# Patient Record
Sex: FEMALE | HISPANIC OR LATINO | Employment: FULL TIME | URBAN - METROPOLITAN AREA
[De-identification: names, ages, dates, MRNs, and addresses within clinical notes are randomized per-mention and may not be internally consistent; named-entity substitution may affect disease eponyms.]

---

## 2023-10-29 PROBLEM — Z86.2 HISTORY OF HYPOCHROMIC MICROCYTIC ANEMIA: Status: ACTIVE | Noted: 2022-05-18

## 2023-10-29 PROBLEM — J10.1 INFLUENZA A: Status: ACTIVE | Noted: 2020-02-05

## 2023-10-29 PROBLEM — E66.9 OBESITY (BMI 30-39.9): Status: ACTIVE | Noted: 2023-10-29

## 2023-10-29 PROBLEM — E78.5 DYSLIPIDEMIA: Status: ACTIVE | Noted: 2023-10-29

## 2023-10-29 PROBLEM — Z90.722 STATUS POST TAH-BSO: Status: ACTIVE | Noted: 2022-10-24

## 2023-10-29 PROBLEM — N85.2 ENLARGED UTERUS: Status: ACTIVE | Noted: 2023-10-29

## 2023-10-29 PROBLEM — N60.09 CYST OF BREAST: Status: ACTIVE | Noted: 2022-11-30

## 2023-10-29 PROBLEM — R50.9 FEVER: Status: ACTIVE | Noted: 2020-02-05

## 2023-10-29 PROBLEM — H10.13 ACUTE ATOPIC CONJUNCTIVITIS, BILATERAL: Status: ACTIVE | Noted: 2022-05-16

## 2023-10-29 PROBLEM — E66.01 MORBID OBESITY (MULTI): Status: ACTIVE | Noted: 2019-10-23

## 2023-10-29 PROBLEM — R68.83 CHILLS: Status: ACTIVE | Noted: 2020-02-05

## 2023-10-29 PROBLEM — J01.90 ACUTE SINUSITIS: Status: ACTIVE | Noted: 2022-05-25

## 2023-10-29 PROBLEM — D25.1 INTRAMURAL LEIOMYOMA OF UTERUS: Status: ACTIVE | Noted: 2023-10-29

## 2023-10-29 PROBLEM — R63.5 WEIGHT GAIN: Status: ACTIVE | Noted: 2023-10-29

## 2023-10-29 PROBLEM — R73.03 PREDIABETES: Status: ACTIVE | Noted: 2021-10-29

## 2023-10-29 PROBLEM — R05.9 COUGH: Status: ACTIVE | Noted: 2020-02-05

## 2023-10-29 PROBLEM — E78.2 MIXED HYPERLIPIDEMIA: Status: ACTIVE | Noted: 2019-11-11

## 2023-10-29 PROBLEM — N63.0 BREAST NODULE: Status: ACTIVE | Noted: 2022-11-25

## 2023-10-29 PROBLEM — M79.18 MYALGIA, OTHER SITE: Status: ACTIVE | Noted: 2023-01-23

## 2023-10-29 PROBLEM — Z90.710 STATUS POST TAH-BSO: Status: ACTIVE | Noted: 2022-10-24

## 2023-10-29 PROBLEM — N92.0 MENORRHAGIA: Status: ACTIVE | Noted: 2023-10-29

## 2023-10-29 PROBLEM — Z90.79 STATUS POST TAH-BSO: Status: ACTIVE | Noted: 2022-10-24

## 2023-10-29 PROBLEM — J30.9 ALLERGIC RHINITIS: Status: ACTIVE | Noted: 2022-05-16

## 2023-10-29 PROBLEM — R19.03 ABDOMINAL MASS, RIGHT LOWER QUADRANT: Status: ACTIVE | Noted: 2023-10-29

## 2023-10-29 RX ORDER — SIMVASTATIN 20 MG/1
20 TABLET, FILM COATED ORAL EVERY MORNING
COMMUNITY
End: 2023-11-03

## 2023-10-29 RX ORDER — DOCUSATE SODIUM 100 MG/1
100 CAPSULE, LIQUID FILLED ORAL 2 TIMES DAILY
COMMUNITY

## 2023-10-29 RX ORDER — ATORVASTATIN CALCIUM 10 MG/1
10 TABLET, FILM COATED ORAL NIGHTLY
COMMUNITY
Start: 2022-10-24 | End: 2023-11-03 | Stop reason: SDUPTHER

## 2023-11-03 ENCOUNTER — OFFICE VISIT (OUTPATIENT)
Dept: PRIMARY CARE | Facility: CLINIC | Age: 53
End: 2023-11-03
Payer: COMMERCIAL

## 2023-11-03 VITALS
HEART RATE: 77 BPM | DIASTOLIC BLOOD PRESSURE: 80 MMHG | BODY MASS INDEX: 43.63 KG/M2 | SYSTOLIC BLOOD PRESSURE: 122 MMHG | TEMPERATURE: 97.9 F | HEIGHT: 67 IN | WEIGHT: 278 LBS

## 2023-11-03 DIAGNOSIS — M79.671 PAIN OF RIGHT HEEL: ICD-10-CM

## 2023-11-03 DIAGNOSIS — Z23 NEED FOR INFLUENZA VACCINATION: ICD-10-CM

## 2023-11-03 DIAGNOSIS — Z00.00 ROUTINE GENERAL MEDICAL EXAMINATION AT A HEALTH CARE FACILITY: ICD-10-CM

## 2023-11-03 DIAGNOSIS — E66.9 OBESITY (BMI 30-39.9): ICD-10-CM

## 2023-11-03 DIAGNOSIS — E78.2 MIXED HYPERLIPIDEMIA: Primary | ICD-10-CM

## 2023-11-03 DIAGNOSIS — Z12.31 ENCOUNTER FOR SCREENING MAMMOGRAM FOR MALIGNANT NEOPLASM OF BREAST: ICD-10-CM

## 2023-11-03 LAB
POC APPEARANCE, URINE: CLEAR
POC BILIRUBIN, URINE: NEGATIVE
POC BLOOD, URINE: NEGATIVE
POC COLOR, URINE: YELLOW
POC GLUCOSE, URINE: NEGATIVE MG/DL
POC KETONES, URINE: NEGATIVE MG/DL
POC LEUKOCYTES, URINE: NEGATIVE
POC NITRITE,URINE: NEGATIVE
POC PH, URINE: 6 PH
POC PROTEIN, URINE: NEGATIVE MG/DL
POC SPECIFIC GRAVITY, URINE: 1.02
POC UROBILINOGEN, URINE: 0.2 EU/DL

## 2023-11-03 PROCEDURE — 90686 IIV4 VACC NO PRSV 0.5 ML IM: CPT | Performed by: FAMILY MEDICINE

## 2023-11-03 PROCEDURE — 81003 URINALYSIS AUTO W/O SCOPE: CPT | Performed by: FAMILY MEDICINE

## 2023-11-03 PROCEDURE — 99212 OFFICE O/P EST SF 10 MIN: CPT | Performed by: FAMILY MEDICINE

## 2023-11-03 PROCEDURE — 1036F TOBACCO NON-USER: CPT | Performed by: FAMILY MEDICINE

## 2023-11-03 PROCEDURE — 90471 IMMUNIZATION ADMIN: CPT | Performed by: FAMILY MEDICINE

## 2023-11-03 PROCEDURE — 99396 PREV VISIT EST AGE 40-64: CPT | Performed by: FAMILY MEDICINE

## 2023-11-03 PROCEDURE — 93000 ELECTROCARDIOGRAM COMPLETE: CPT | Performed by: FAMILY MEDICINE

## 2023-11-03 RX ORDER — ATORVASTATIN CALCIUM 10 MG/1
10 TABLET, FILM COATED ORAL NIGHTLY
Qty: 90 TABLET | Refills: 0 | Status: SHIPPED | OUTPATIENT
Start: 2023-11-03 | End: 2023-11-04 | Stop reason: DRUGHIGH

## 2023-11-03 ASSESSMENT — PAIN SCALES - GENERAL: PAINLEVEL: 0-NO PAIN

## 2023-11-03 ASSESSMENT — PATIENT HEALTH QUESTIONNAIRE - PHQ9
2. FEELING DOWN, DEPRESSED OR HOPELESS: NOT AT ALL
1. LITTLE INTEREST OR PLEASURE IN DOING THINGS: NOT AT ALL
SUM OF ALL RESPONSES TO PHQ9 QUESTIONS 1 AND 2: 0

## 2023-11-03 ASSESSMENT — ENCOUNTER SYMPTOMS
CONSTITUTIONAL NEGATIVE: 1
PSYCHIATRIC NEGATIVE: 1
NEUROLOGICAL NEGATIVE: 1
HEMATOLOGIC/LYMPHATIC NEGATIVE: 1
ALLERGIC/IMMUNOLOGIC NEGATIVE: 1
GASTROINTESTINAL NEGATIVE: 1
CARDIOVASCULAR NEGATIVE: 1
EYES NEGATIVE: 1
ENDOCRINE NEGATIVE: 1
MUSCULOSKELETAL NEGATIVE: 1

## 2023-11-03 NOTE — PROGRESS NOTES
"Subjective   Patient ID: Eunice Hernandez is a 53 y.o. female who presents for Annual Exam.    Tetanus (received it at [facility] 5 years ago) and COVID-19 vaccinations are UTD.  She agrees to receive an influenza vaccination today.  She is under the care of gynecology and had a hysterectomy.  She is due for a screening mammogram and agrees to receive an order today.  She had a screening colonoscopy completed this year, and she was instructed to repeat the test in 7 years.  EKG is due, and she agrees to have it completed today.  Fasting labs were not completed for this visit.     1) Hyperlipidemia: chronic, current status unknown, compliant with atorvastatin, tries to follow a low fat diet, exercises 1-2 days a week, heart risk score is 2.4%, needs refill.  2) Right heel pain: new, began 2 weeks ago, pain only occurs when she is walking and standing, would like to find out if she has a heel spur.      Review of Systems   Constitutional: Negative.    HENT: Negative.     Eyes: Negative.    Cardiovascular: Negative.    Gastrointestinal: Negative.    Endocrine: Negative.    Genitourinary: Negative.    Musculoskeletal: Negative.         Right heel pain.   Skin: Negative.    Allergic/Immunologic: Negative.    Neurological: Negative.    Hematological: Negative.    Psychiatric/Behavioral: Negative.       Objective   Ht 1.702 m (5' 7\")   Wt 126 kg (278 lb)   BMI 43.54 kg/m²     Physical Exam  Vitals reviewed.   Constitutional:       Appearance: Normal appearance. She is obese.   HENT:      Head: Normocephalic and atraumatic.      Right Ear: Tympanic membrane, ear canal and external ear normal.      Left Ear: Tympanic membrane, ear canal and external ear normal.      Nose: Nose normal.      Mouth/Throat:      Mouth: Mucous membranes are moist.      Pharynx: Oropharynx is clear.   Eyes:      Extraocular Movements: Extraocular movements intact.      Conjunctiva/sclera: Conjunctivae normal.      Pupils: Pupils are equal, round, " and reactive to light.   Cardiovascular:      Rate and Rhythm: Normal rate and regular rhythm.      Pulses: Normal pulses.      Heart sounds: Normal heart sounds.   Pulmonary:      Effort: Pulmonary effort is normal.      Breath sounds: Normal breath sounds.   Abdominal:      General: Bowel sounds are normal.      Palpations: Abdomen is soft.   Musculoskeletal:         General: Normal range of motion.      Cervical back: Normal range of motion and neck supple.      Comments: Trace LE edema bilaterally.  Veins visible bilaterally.     Skin:     General: Skin is warm and dry.      Capillary Refill: Capillary refill takes less than 2 seconds.   Neurological:      General: No focal deficit present.      Mental Status: She is alert and oriented to person, place, and time.   Psychiatric:         Mood and Affect: Mood normal.         Behavior: Behavior normal.         Thought Content: Thought content normal.         Judgment: Judgment normal.     Assessment/Plan   Problem List Items Addressed This Visit             ICD-10-CM    Obesity (BMI 30-39.9) E66.9    Relevant Orders    ECG 12 lead (Clinic Performed) (Completed)    CBC and Auto Differential (Completed)    Comprehensive Metabolic Panel (Completed)    Lipid Panel (Completed)    Mixed hyperlipidemia - Primary E78.2    Relevant Orders    ECG 12 lead (Clinic Performed) (Completed)    Comprehensive Metabolic Panel (Completed)    Lipid Panel (Completed)     Other Visit Diagnoses         Codes    Routine general medical examination at a health care facility     Z00.00    Relevant Orders    CBC and Auto Differential (Completed)    Comprehensive Metabolic Panel (Completed)    Lipid Panel (Completed)    POCT UA Automated manually resulted (Completed)    Pain of right heel     M79.671    Relevant Orders    XR calcaneus right 2 views    Need for influenza vaccination     Z23    Relevant Orders    Flu vaccine (IIV4) age 6 months and greater, preservative free (Completed)     Encounter for screening mammogram for malignant neoplasm of breast     Z12.31    Relevant Orders    BI mammo bilateral screening tomosynthesis        PE completed.  Tetanus and COVID-19 vaccinations UTD.  Influenza vaccination given.  Repeat in 1 year.  Keep follow-up appointments with gynecology.  Await input.  Screening mammogram ordered.  Await results.  Colon cancer screening UTD.  EKG completed.  Fasting labs ordered.  Will notify with results.  Hyperlipidemia chronic and current status unknown.  Continue with atorvastatin, refilled.  Right heel pain new.  Right calcaneous x-rays ordered.  Will notify with results.    Healthy diet.  Regular exercise.  Manage weight.  Repeat exam in 1 year.

## 2023-11-04 ENCOUNTER — LAB (OUTPATIENT)
Dept: LAB | Facility: LAB | Age: 53
End: 2023-11-04
Payer: COMMERCIAL

## 2023-11-04 DIAGNOSIS — E78.2 MIXED HYPERLIPIDEMIA: ICD-10-CM

## 2023-11-04 DIAGNOSIS — E78.2 MIXED HYPERLIPIDEMIA: Primary | ICD-10-CM

## 2023-11-04 DIAGNOSIS — E66.9 OBESITY (BMI 30-39.9): ICD-10-CM

## 2023-11-04 DIAGNOSIS — Z00.00 ROUTINE GENERAL MEDICAL EXAMINATION AT A HEALTH CARE FACILITY: ICD-10-CM

## 2023-11-04 LAB
ALBUMIN SERPL-MCNC: 4.3 G/DL (ref 3.5–5)
ALP BLD-CCNC: 98 U/L (ref 35–125)
ALT SERPL-CCNC: 23 U/L (ref 5–40)
ANION GAP SERPL CALC-SCNC: 13 MMOL/L
AST SERPL-CCNC: 17 U/L (ref 5–40)
BASOPHILS # BLD AUTO: 0.07 X10*3/UL (ref 0–0.1)
BASOPHILS NFR BLD AUTO: 1 %
BILIRUB SERPL-MCNC: 0.2 MG/DL (ref 0.1–1.2)
BUN SERPL-MCNC: 11 MG/DL (ref 8–25)
CALCIUM SERPL-MCNC: 9.7 MG/DL (ref 8.5–10.4)
CHLORIDE SERPL-SCNC: 101 MMOL/L (ref 97–107)
CHOLEST SERPL-MCNC: 345 MG/DL (ref 133–200)
CHOLEST/HDLC SERPL: 5.8 {RATIO}
CO2 SERPL-SCNC: 26 MMOL/L (ref 24–31)
CREAT SERPL-MCNC: 0.7 MG/DL (ref 0.4–1.6)
EOSINOPHIL # BLD AUTO: 0.19 X10*3/UL (ref 0–0.7)
EOSINOPHIL NFR BLD AUTO: 2.8 %
ERYTHROCYTE [DISTWIDTH] IN BLOOD BY AUTOMATED COUNT: 14.3 % (ref 11.5–14.5)
GFR SERPL CREATININE-BSD FRML MDRD: >90 ML/MIN/1.73M*2
GLUCOSE SERPL-MCNC: 86 MG/DL (ref 65–99)
HCT VFR BLD AUTO: 40.6 % (ref 36–46)
HDLC SERPL-MCNC: 60 MG/DL
HGB BLD-MCNC: 12.8 G/DL (ref 12–16)
IMM GRANULOCYTES # BLD AUTO: 0.02 X10*3/UL (ref 0–0.7)
IMM GRANULOCYTES NFR BLD AUTO: 0.3 % (ref 0–0.9)
LDLC SERPL CALC-MCNC: 246 MG/DL (ref 65–130)
LYMPHOCYTES # BLD AUTO: 2.26 X10*3/UL (ref 1.2–4.8)
LYMPHOCYTES NFR BLD AUTO: 33.5 %
MCH RBC QN AUTO: 27.6 PG (ref 26–34)
MCHC RBC AUTO-ENTMCNC: 31.5 G/DL (ref 32–36)
MCV RBC AUTO: 88 FL (ref 80–100)
MONOCYTES # BLD AUTO: 0.54 X10*3/UL (ref 0.1–1)
MONOCYTES NFR BLD AUTO: 8 %
NEUTROPHILS # BLD AUTO: 3.66 X10*3/UL (ref 1.2–7.7)
NEUTROPHILS NFR BLD AUTO: 54.4 %
NRBC BLD-RTO: 0 /100 WBCS (ref 0–0)
PLATELET # BLD AUTO: 338 X10*3/UL (ref 150–450)
POTASSIUM SERPL-SCNC: 4.4 MMOL/L (ref 3.4–5.1)
PROT SERPL-MCNC: 7.8 G/DL (ref 5.9–7.9)
RBC # BLD AUTO: 4.63 X10*6/UL (ref 4–5.2)
SODIUM SERPL-SCNC: 140 MMOL/L (ref 133–145)
TRIGL SERPL-MCNC: 197 MG/DL (ref 40–150)
WBC # BLD AUTO: 6.7 X10*3/UL (ref 4.4–11.3)

## 2023-11-04 PROCEDURE — 80053 COMPREHEN METABOLIC PANEL: CPT

## 2023-11-04 PROCEDURE — 36415 COLL VENOUS BLD VENIPUNCTURE: CPT

## 2023-11-04 PROCEDURE — 80061 LIPID PANEL: CPT

## 2023-11-04 PROCEDURE — 85025 COMPLETE CBC W/AUTO DIFF WBC: CPT

## 2023-11-04 RX ORDER — ATORVASTATIN CALCIUM 20 MG/1
20 TABLET, FILM COATED ORAL DAILY
Qty: 90 TABLET | Refills: 1 | Status: SHIPPED | OUTPATIENT
Start: 2023-11-04 | End: 2023-11-07 | Stop reason: SDUPTHER

## 2023-11-07 ENCOUNTER — TELEPHONE (OUTPATIENT)
Dept: PRIMARY CARE | Facility: CLINIC | Age: 53
End: 2023-11-07
Payer: COMMERCIAL

## 2023-11-07 DIAGNOSIS — E78.2 MIXED HYPERLIPIDEMIA: ICD-10-CM

## 2023-11-07 RX ORDER — ATORVASTATIN CALCIUM 20 MG/1
20 TABLET, FILM COATED ORAL DAILY
Qty: 90 TABLET | Refills: 1 | Status: SHIPPED | OUTPATIENT
Start: 2023-11-07 | End: 2024-05-05

## 2023-11-07 NOTE — TELEPHONE ENCOUNTER
----- Message from Deb Siddiqi DO sent at 11/4/2023  4:26 PM EDT -----  Please inform the patient that total cholesterol is elevated, LDL (bad cholesterol) is elevated, and triglycerides are elevated.  Please have her increase atorvastatin to 20 mg daily.  The lipid panel will be repeated in 3 months.  Thank you.

## 2023-12-01 ENCOUNTER — ANCILLARY PROCEDURE (OUTPATIENT)
Dept: RADIOLOGY | Facility: CLINIC | Age: 53
End: 2023-12-01
Payer: COMMERCIAL

## 2023-12-01 DIAGNOSIS — M79.671 PAIN OF RIGHT HEEL: ICD-10-CM

## 2023-12-01 PROCEDURE — 73650 X-RAY EXAM OF HEEL: CPT | Mod: RT

## 2023-12-08 ENCOUNTER — TELEPHONE (OUTPATIENT)
Dept: PRIMARY CARE | Facility: CLINIC | Age: 53
End: 2023-12-08
Payer: COMMERCIAL

## 2023-12-08 DIAGNOSIS — M79.671 PAIN OF RIGHT HEEL: Primary | ICD-10-CM

## 2023-12-08 DIAGNOSIS — M77.31 CALCANEAL SPUR, RIGHT: ICD-10-CM

## 2023-12-08 NOTE — TELEPHONE ENCOUNTER
Left message with STEPHANIE's information and informed that referral was put in mail should she want to proceed with that.

## 2023-12-12 ENCOUNTER — TELEPHONE (OUTPATIENT)
Dept: PRIMARY CARE | Facility: CLINIC | Age: 53
End: 2023-12-12
Payer: COMMERCIAL

## 2023-12-12 ENCOUNTER — ANCILLARY PROCEDURE (OUTPATIENT)
Dept: RADIOLOGY | Facility: CLINIC | Age: 53
End: 2023-12-12
Payer: COMMERCIAL

## 2023-12-12 VITALS — HEIGHT: 71 IN | WEIGHT: 260 LBS | BODY MASS INDEX: 36.4 KG/M2

## 2023-12-12 DIAGNOSIS — Z12.31 ENCOUNTER FOR SCREENING MAMMOGRAM FOR MALIGNANT NEOPLASM OF BREAST: ICD-10-CM

## 2023-12-12 DIAGNOSIS — N63.0 BREAST NODULE: ICD-10-CM

## 2023-12-12 PROCEDURE — 77067 SCR MAMMO BI INCL CAD: CPT

## 2023-12-12 PROCEDURE — 77063 BREAST TOMOSYNTHESIS BI: CPT

## 2023-12-12 NOTE — TELEPHONE ENCOUNTER
----- Message from Deb Siddiqi DO sent at 12/12/2023  1:23 PM EST -----  The patient needs an ultrasound of the right breast to further inspect the right breast.  Please notify the patient.  Order and pend the US to me.  Thank you.

## 2023-12-15 ENCOUNTER — APPOINTMENT (OUTPATIENT)
Dept: RADIOLOGY | Facility: HOSPITAL | Age: 53
End: 2023-12-15
Payer: COMMERCIAL

## 2024-03-13 ENCOUNTER — OFFICE VISIT (OUTPATIENT)
Dept: PODIATRY | Facility: CLINIC | Age: 54
End: 2024-03-13
Payer: COMMERCIAL

## 2024-03-13 DIAGNOSIS — M77.31 CALCANEAL SPUR, RIGHT: ICD-10-CM

## 2024-03-13 DIAGNOSIS — M79.671 PAIN OF RIGHT HEEL: ICD-10-CM

## 2024-03-13 PROCEDURE — 99204 OFFICE O/P NEW MOD 45 MIN: CPT | Performed by: PODIATRIST

## 2024-03-13 PROCEDURE — 1036F TOBACCO NON-USER: CPT | Performed by: PODIATRIST

## 2024-03-13 RX ORDER — IBUPROFEN 600 MG/1
600 TABLET ORAL 3 TIMES DAILY
Qty: 90 TABLET | Refills: 0 | Status: SHIPPED | OUTPATIENT
Start: 2024-03-13 | End: 2024-04-12

## 2024-03-13 NOTE — PROGRESS NOTES
History of Present Illness:   Patient states they are here for foot pain  Has pain to heel   Pain when walking  Denies trauma  Present with son who is helping to translate.    Past Medical History  No past medical history on file.    Medications and Allergies have been reviewed.    Review Of Systems:  GENERAL: No weight loss, malaise or fevers.  HEENT: Negative for frequent or significant headaches,   RESPIRATORY: Negative for cough, wheezing or shortness of breath.  CARDIOVASCULAR: Negative for chest pain, leg swelling or palpitations.    Examination of Both Lower Extremities:   Objective:   Vasc: DP and PT pulses are palpable bilateral.  CFT is less than 3 seconds bilateral.  Skin temperature is warm to cool proximal to distal bilateral.      Neuro: Vibratory, light touch and proprioception are intact bilateral.       Derm: Nails 1-5 bilateral are intact.  Skin is supple with normal texture and turgor noted.      Ortho: Muscle strength is 5/5 for all pedal groups tested.  Right ankle, pain to med calc tubercle. No edema, erythema or ecchymosis.     1. Pain of right heel  Referral to Podiatry    Referral to Physical Therapy    ibuprofen 600 mg tablet    [employer] Orthotics      2. Calcaneal spur, right  Referral to Podiatry    Referral to Physical Therapy    ibuprofen 600 mg tablet    Custom Orthotics        1. Pain of right heel  Referral to Podiatry    Referral to Physical Therapy    ibuprofen 600 mg tablet    Custom Orthotics      2. Calcaneal spur, right  Referral to Podiatry    Referral to Physical Therapy    ibuprofen 600 mg tablet    Custom Orthotics        Patient exam and eval  Discussed Plantar fasciitis/heel spur of the foot.  Discussed causes, symptoms, aggravating factors and treatment options  Discussed radiographs - reviewed - discussed heel spurs noted  Recommend stiff supportive shoe gear  Shoes that do not twist or bend  Recommend power step  Gave rx for custom inserts. List of vendors  Gave rx for  nsaids  Discussed injection to plantar fascia to help decrease pain - deferred at this time  Discussed ice, nsaids, neel oliver/biofreeze  Patient to follow up if no improvement noted.   Pt in agreement to plan  All questions answered      Pao Sesay DPM  [phone]  [facility] [address]  Fax: [phone]

## 2024-08-06 ENCOUNTER — TELEPHONE (OUTPATIENT)
Dept: PRIMARY CARE | Facility: CLINIC | Age: 54
End: 2024-08-06

## 2024-08-06 DIAGNOSIS — E78.2 MIXED HYPERLIPIDEMIA: ICD-10-CM

## 2024-08-06 DIAGNOSIS — R73.03 PREDIABETES: ICD-10-CM

## 2024-08-06 DIAGNOSIS — Z00.00 ROUTINE GENERAL MEDICAL EXAMINATION AT A HEALTH CARE FACILITY: ICD-10-CM

## 2024-11-07 ENCOUNTER — APPOINTMENT (OUTPATIENT)
Dept: PRIMARY CARE | Facility: CLINIC | Age: 54
End: 2024-11-07
Payer: COMMERCIAL

## 2024-11-07 VITALS
HEART RATE: 79 BPM | WEIGHT: 276 LBS | BODY MASS INDEX: 38.64 KG/M2 | DIASTOLIC BLOOD PRESSURE: 71 MMHG | OXYGEN SATURATION: 92 % | HEIGHT: 71 IN | SYSTOLIC BLOOD PRESSURE: 111 MMHG | TEMPERATURE: 97.2 F

## 2024-11-07 DIAGNOSIS — Z00.00 WELL ADULT EXAM: Primary | ICD-10-CM

## 2024-11-07 DIAGNOSIS — R73.03 PREDIABETES: ICD-10-CM

## 2024-11-07 DIAGNOSIS — Z12.31 ENCOUNTER FOR SCREENING MAMMOGRAM FOR BREAST CANCER: ICD-10-CM

## 2024-11-07 DIAGNOSIS — L81.9 PIGMENTED SKIN LESION: ICD-10-CM

## 2024-11-07 DIAGNOSIS — E78.2 MIXED HYPERLIPIDEMIA: ICD-10-CM

## 2024-11-07 PROBLEM — R05.9 COUGH: Status: RESOLVED | Noted: 2020-02-05 | Resolved: 2024-11-07

## 2024-11-07 PROBLEM — N60.09 SOLITARY CYST OF BREAST: Status: RESOLVED | Noted: 2022-11-25 | Resolved: 2024-11-07

## 2024-11-07 PROBLEM — H10.10 ACUTE ATOPIC CONJUNCTIVITIS: Status: RESOLVED | Noted: 2022-05-16 | Resolved: 2024-11-07

## 2024-11-07 PROBLEM — R19.00 ABDOMINAL MASS: Status: RESOLVED | Noted: 2023-10-29 | Resolved: 2024-11-07

## 2024-11-07 PROBLEM — H10.13 ACUTE ATOPIC CONJUNCTIVITIS, BILATERAL: Status: RESOLVED | Noted: 2022-05-16 | Resolved: 2024-11-07

## 2024-11-07 PROBLEM — J01.90 ACUTE SINUSITIS: Status: RESOLVED | Noted: 2022-05-25 | Resolved: 2024-11-07

## 2024-11-07 PROBLEM — E66.01 MORBID OBESITY (MULTI): Status: RESOLVED | Noted: 2019-10-23 | Resolved: 2024-11-07

## 2024-11-07 PROBLEM — J10.1 INFLUENZA A: Status: RESOLVED | Noted: 2020-02-05 | Resolved: 2024-11-07

## 2024-11-07 PROBLEM — R50.9 FEVER: Status: RESOLVED | Noted: 2020-02-05 | Resolved: 2024-11-07

## 2024-11-07 PROBLEM — Z86.2 HISTORY OF ANEMIA: Status: RESOLVED | Noted: 2022-05-18 | Resolved: 2024-11-07

## 2024-11-07 PROBLEM — R63.5 WEIGHT GAIN: Status: RESOLVED | Noted: 2023-10-29 | Resolved: 2024-11-07

## 2024-11-07 PROBLEM — M79.18 MYALGIA, OTHER SITE: Status: RESOLVED | Noted: 2023-01-23 | Resolved: 2024-11-07

## 2024-11-07 PROBLEM — N85.2 ENLARGED UTERUS: Status: RESOLVED | Noted: 2023-10-29 | Resolved: 2024-11-07

## 2024-11-07 PROBLEM — M79.671 PAIN OF RIGHT HEEL: Status: RESOLVED | Noted: 2024-11-07 | Resolved: 2024-11-07

## 2024-11-07 PROBLEM — N92.0 MENORRHAGIA: Status: RESOLVED | Noted: 2023-10-29 | Resolved: 2024-11-07

## 2024-11-07 PROBLEM — R68.83 CHILLS: Status: RESOLVED | Noted: 2020-02-05 | Resolved: 2024-11-07

## 2024-11-07 ASSESSMENT — ENCOUNTER SYMPTOMS
CONSTITUTIONAL NEGATIVE: 1
GASTROINTESTINAL NEGATIVE: 1
ALLERGIC/IMMUNOLOGIC NEGATIVE: 1
EYES NEGATIVE: 1
HEMATOLOGIC/LYMPHATIC NEGATIVE: 1
PSYCHIATRIC NEGATIVE: 1
MUSCULOSKELETAL NEGATIVE: 1
CARDIOVASCULAR NEGATIVE: 1
NEUROLOGICAL NEGATIVE: 1
RESPIRATORY NEGATIVE: 1

## 2024-11-07 ASSESSMENT — PATIENT HEALTH QUESTIONNAIRE - PHQ9
1. LITTLE INTEREST OR PLEASURE IN DOING THINGS: NOT AT ALL
SUM OF ALL RESPONSES TO PHQ9 QUESTIONS 1 AND 2: 0
2. FEELING DOWN, DEPRESSED OR HOPELESS: NOT AT ALL

## 2024-11-07 ASSESSMENT — COLUMBIA-SUICIDE SEVERITY RATING SCALE - C-SSRS
2. HAVE YOU ACTUALLY HAD ANY THOUGHTS OF KILLING YOURSELF?: NO
6. HAVE YOU EVER DONE ANYTHING, STARTED TO DO ANYTHING, OR PREPARED TO DO ANYTHING TO END YOUR LIFE?: NO
1. IN THE PAST MONTH, HAVE YOU WISHED YOU WERE DEAD OR WISHED YOU COULD GO TO SLEEP AND NOT WAKE UP?: NO

## 2024-11-07 NOTE — PROGRESS NOTES
"Subjective   Patient ID: Eunice Hernandez is a 54 y.o. female who presents for Annual Exam (EKG 11-3-23/Pt forgot to do blood work she said she will do it Saturday ).    Tetanus (received it at [facility] 6 years ago) and COVID-19 vaccinations are UTD.  She declines a shingles vaccination as she has never had chickenpox.  She agrees to receive an influenza vaccination today.  She is under the care of gynecology and had a hysterectomy.  She is due for a screening mammogram in January and agrees to receive an order today.  She had a screening colonoscopy completed last year, and she was instructed to repeat the test in 7 years.  EKG is UTD.  Fasting labs were not completed for this visit.     1) Hyperlipidemia: current status unknown, noncompliant with atorvastatin, patient says she just stopped taking for no reason, tries to follow a low fat diet, exercises 1-2 days a week.  2) Prediabetes: current status unknown, no medication taken, tries to follow a low carb diet, exercises as stated above.  3) Pigmented skin lesion: present for many years, thinks it has changed, would like to consult derm.      Review of Systems   Constitutional: Negative.    HENT: Negative.     Eyes: Negative.    Respiratory: Negative.     Cardiovascular: Negative.    Gastrointestinal: Negative.    Genitourinary: Negative.    Musculoskeletal: Negative.    Skin: Negative.         Positive for skin lesion on left back.   Allergic/Immunologic: Negative.    Neurological: Negative.    Hematological: Negative.    Psychiatric/Behavioral: Negative.       Objective   /71 (BP Location: Left arm, Patient Position: Sitting, BP Cuff Size: Adult)   Pulse 79   Temp 36.2 °C (97.2 °F) (Temporal)   Ht 1.803 m (5' 11\")   Wt 125 kg (276 lb)   SpO2 92%   BMI 38.49 kg/m²     Physical Exam  Vitals and nursing note reviewed.   Constitutional:       Appearance: Normal appearance. She is obese.   HENT:      Head: Normocephalic and atraumatic.      Right Ear: " Tympanic membrane, ear canal and external ear normal.      Left Ear: Tympanic membrane, ear canal and external ear normal.      Nose: Nose normal.      Mouth/Throat:      Mouth: Mucous membranes are moist.      Pharynx: Oropharynx is clear.   Eyes:      Extraocular Movements: Extraocular movements intact.      Conjunctiva/sclera: Conjunctivae normal.      Pupils: Pupils are equal, round, and reactive to light.   Neck:      Vascular: No carotid bruit.      Comments: No thyromegaly  Cardiovascular:      Rate and Rhythm: Normal rate and regular rhythm.      Pulses: Normal pulses.      Heart sounds: Normal heart sounds.   Pulmonary:      Effort: Pulmonary effort is normal.      Breath sounds: Normal breath sounds.   Abdominal:      General: Bowel sounds are normal.      Palpations: Abdomen is soft.   Musculoskeletal:         General: Normal range of motion.      Cervical back: Normal range of motion and neck supple.      Comments: 5/5 muscle strength x 4 extremities   Skin:     General: Skin is warm and dry.      Findings: Lesion (Left mid back: oval shaped pigmented papule with assymetric pigmentation) present.   Neurological:      General: No focal deficit present.      Mental Status: She is alert and oriented to person, place, and time.   Psychiatric:         Mood and Affect: Mood normal.         Behavior: Behavior normal.     Assessment/Plan   Diagnoses and all orders for this visit:  Well adult exam  PE completed.  Tetanus and COVID-19 vaccinations UTD.  Patient declines shingles vaccination.    Keep follow-up appointments with gyn.  Await input.  Colon cancer screening UTD.  EKG UTD.  Patient will complete labs soon.  Will notify with results.  Repeat exam in 1 year.   Mixed hyperlipidemia  Current status unknown  Patient noncompliant with atorvastatin.  Low fat diet.  Regular exercise.  Manage weight.  Await lab results.  Follow up in 1 year.   Prediabetes  Current status unknown  No medication taken.  Low carb  diet.  Regular exercise.  Manage weight.  Await lab results.  Follow up in 1 year.   Pigmented skin lesion  Chronic  Referral to Dermatology  Await input.  Follow up as directed.  Encounter for screening mammogram for breast cancer  BI mammo bilateral screening tomosynthesis ordered.  Await input.  Other orders  -     Follow Up In Primary Care - Health Maintenance  Flu vaccine, trivalent, preservative free, age 6 months and greater (Fluarix/Fluzone/Flulaval) given.  Repeat in 1 year.  -     Follow Up In Primary Care - Health Maintenance; Future

## 2025-11-11 ENCOUNTER — APPOINTMENT (OUTPATIENT)
Dept: PRIMARY CARE | Facility: CLINIC | Age: 55
End: 2025-11-11
Payer: COMMERCIAL